# Patient Record
Sex: MALE | Race: BLACK OR AFRICAN AMERICAN
[De-identification: names, ages, dates, MRNs, and addresses within clinical notes are randomized per-mention and may not be internally consistent; named-entity substitution may affect disease eponyms.]

---

## 2021-02-01 ENCOUNTER — HOSPITAL ENCOUNTER (EMERGENCY)
Dept: HOSPITAL 72 - EMR | Age: 31
LOS: 1 days | Discharge: HOME | End: 2021-02-02
Payer: SELF-PAY

## 2021-02-01 VITALS — HEIGHT: 74 IN | BODY MASS INDEX: 20.53 KG/M2 | WEIGHT: 160 LBS

## 2021-02-01 DIAGNOSIS — J45.909: ICD-10-CM

## 2021-02-01 DIAGNOSIS — R07.9: Primary | ICD-10-CM

## 2021-02-01 DIAGNOSIS — Z79.899: ICD-10-CM

## 2021-02-01 PROCEDURE — 74175 CTA ABDOMEN W/CONTRAST: CPT

## 2021-02-01 PROCEDURE — 71045 X-RAY EXAM CHEST 1 VIEW: CPT

## 2021-02-01 PROCEDURE — 99284 EMERGENCY DEPT VISIT MOD MDM: CPT

## 2021-02-01 PROCEDURE — 85379 FIBRIN DEGRADATION QUANT: CPT

## 2021-02-01 PROCEDURE — 93005 ELECTROCARDIOGRAM TRACING: CPT

## 2021-02-01 PROCEDURE — 85025 COMPLETE CBC W/AUTO DIFF WBC: CPT

## 2021-02-01 PROCEDURE — 36415 COLL VENOUS BLD VENIPUNCTURE: CPT

## 2021-02-01 PROCEDURE — 84484 ASSAY OF TROPONIN QUANT: CPT

## 2021-02-01 PROCEDURE — 71275 CT ANGIOGRAPHY CHEST: CPT

## 2021-02-01 PROCEDURE — 80053 COMPREHEN METABOLIC PANEL: CPT

## 2021-02-01 PROCEDURE — 80307 DRUG TEST PRSMV CHEM ANLYZR: CPT

## 2021-02-02 VITALS — DIASTOLIC BLOOD PRESSURE: 75 MMHG | SYSTOLIC BLOOD PRESSURE: 120 MMHG

## 2021-02-02 VITALS — DIASTOLIC BLOOD PRESSURE: 86 MMHG | SYSTOLIC BLOOD PRESSURE: 137 MMHG

## 2021-02-02 VITALS — DIASTOLIC BLOOD PRESSURE: 84 MMHG | SYSTOLIC BLOOD PRESSURE: 112 MMHG

## 2021-02-02 LAB
ADD MANUAL DIFF: NO
ALBUMIN SERPL-MCNC: 4.4 G/DL (ref 3.4–5)
ALBUMIN/GLOB SERPL: 1.1 {RATIO} (ref 1–2.7)
ALP SERPL-CCNC: 80 U/L (ref 46–116)
ALT SERPL-CCNC: 18 U/L (ref 12–78)
ANION GAP SERPL CALC-SCNC: 10 MMOL/L (ref 5–15)
AST SERPL-CCNC: 21 U/L (ref 15–37)
BASOPHILS NFR BLD AUTO: 1.7 % (ref 0–2)
BILIRUB SERPL-MCNC: 0.8 MG/DL (ref 0.2–1)
BUN SERPL-MCNC: 10 MG/DL (ref 7–18)
CALCIUM SERPL-MCNC: 9.5 MG/DL (ref 8.5–10.1)
CHLORIDE SERPL-SCNC: 103 MMOL/L (ref 98–107)
CO2 SERPL-SCNC: 26 MMOL/L (ref 21–32)
CREAT SERPL-MCNC: 0.9 MG/DL (ref 0.55–1.3)
EOSINOPHIL NFR BLD AUTO: 0.6 % (ref 0–3)
ERYTHROCYTE [DISTWIDTH] IN BLOOD BY AUTOMATED COUNT: 11.9 % (ref 11.6–14.8)
GLOBULIN SER-MCNC: 3.9 G/DL
HCT VFR BLD CALC: 45.5 % (ref 42–52)
HGB BLD-MCNC: 14.5 G/DL (ref 14.2–18)
LYMPHOCYTES NFR BLD AUTO: 54.8 % (ref 20–45)
MCV RBC AUTO: 85 FL (ref 80–99)
MONOCYTES NFR BLD AUTO: 7.5 % (ref 1–10)
NEUTROPHILS NFR BLD AUTO: 35.3 % (ref 45–75)
PLATELET # BLD: 201 K/UL (ref 150–450)
POTASSIUM SERPL-SCNC: 3.8 MMOL/L (ref 3.5–5.1)
RBC # BLD AUTO: 5.38 M/UL (ref 4.7–6.1)
SODIUM SERPL-SCNC: 139 MMOL/L (ref 136–145)
WBC # BLD AUTO: 6 K/UL (ref 4.8–10.8)

## 2021-02-02 NOTE — DIAGNOSTIC IMAGING REPORT
EXAM:

  XR Chest, 1 View

 

CLINICAL HISTORY:

  CP

 

TECHNIQUE:

  Frontal view of the chest.

 

COMPARISON:

  No relevant prior studies available.

 

FINDINGS:

  Lungs:  No consolidation.

  Pleural space:  No pleural effusion. No pneumothorax.

  Heart:  Unremarkable.  No cardiomegaly.

 

IMPRESSION:     

  No acute cardiopulmonary abnormality.

## 2021-02-02 NOTE — NUR
ED Nurse Note:



Pt ambulated into ED. AAO x4, c/o chest pressure worse the past couple of days. 
EKG completed, urine and labs collected.

## 2021-02-02 NOTE — EMERGENCY ROOM REPORT
History of Present Illness


General


Chief Complaint:  Chest Pain


Source:  Patient





Present Illness


HPI


30-year-old male with no relevant past medical history here with chest pain for 

2 days.  Patient says that he was walking yesterday and felt sudden onset of 

left-sided chest pain rating down his left arm.  Patient and his wife have a 

home blood pressure monitor and he said that each time he took his blood pressu

re his right arm blood pressure was around 115/80 and his left arm blood 

pressure was around 145/90.  He took an aspirin 2 days ago without much relief. 

Pain is cramping in nature, located on the left side of his chest, radiates down

his left arm and into his left upper back.  No fevers, chills, palpitations, 

back pain, abdominal pain, nausea, vomiting, diaphoresis, diarrhea, dysuria.


Allergies:  


Coded Allergies:  


     Dairy (Verified  Allergy, Unknown, 2/1/21)





COVID-19 Screening


Contact w/high risk pt:  No


Experienced COVID-19 symptoms?:  No


COVID-19 Testing performed PTA:  No


COVID-19 Screening:  Negative COVID-19


COVID-19 Testing Source:  advance prn





Nursing Documentation-PMH


Hx Asthma:  Yes





Review of Systems


All Other Systems:  negative except mentioned in HPI





Physical Exam





Vital Signs








  Date Time  Temp Pulse Resp B/P (MAP) Pulse Ox O2 Delivery O2 Flow Rate FiO2


 


2/1/21 23:51 98.2 78 20 137/86 (103) 98   








Sp02 EP Interpretation:  reviewed, normal


General Appearance:  no apparent distress, alert, non-toxic


Head:  normocephalic, atraumatic


Eyes:  bilateral eye normal inspection, bilateral eye PERRL


ENT:  hearing grossly normal, normal pharynx, no angioedema, normal voice


Neck:  full range of motion, supple/symm/no masses


Respiratory:  chest non-tender, lungs clear, normal breath sounds, speaking full

sentences


Cardiovascular #1:  regular rate, rhythm, no edema


Cardiovascular #2:  2+ carotid (R), 2+ carotid (L), 2+ radial (R), 2+ radial 

(L), 2+ dorsalis pedis (R), 2+ dorsalis pedis (L)


Gastrointestinal:  normal bowel sounds, non tender, soft, non-distended, no 

guarding, no rebound


Rectal:  deferred


Genitourinary:  normal inspection, no CVA tenderness


Musculoskeletal:  back normal, normal range of motion, gait/station normal, non-

tender


Neurologic:  alert, motor strength/tone normal, oriented x3, sensory intact, 

responsive, speech normal


Psychiatric:  judgement/insight normal, memory normal, mood/affect normal, no 

suicidal/homicidal ideation


Lymphatic:  no adenopathy





Medical Decision Making


Diagnostic Impression:  


   Primary Impression:  


   Chest pain


ER Course


EKG: NSR, no ischemia, intervals WNL. No ectopy.  Rate 60 bpm


Rhythm strip: patient monitored for arrhythmias - no malignant dysrhythmias, 

runs of PVCs, nor pauses noted





CXR: No infiltrate/effusion. Mediastinum within normal limits. No 

consolidations. No free air under the diaphragm. No bony abnormalities





CTA chest abd pel:


FINDINGS:


  Pulmonary arteries:  There is no evidence of pulmonary embolism.


  Aorta:  There is no evidence of thoracic aortic dissection.


  Lungs:  Unremarkable.  No mass.  No consolidation.


  Pleural space:  Unremarkable.  No significant effusion.  No 


pneumothorax.


  Heart:  Unremarkable.  No cardiomegaly.  No significant pericardial 


effusion.  No evidence of RV dysfunction.


  Bones/joints:  No acute fracture.  No dislocation.


  Soft tissues:  Unremarkable.


  Lymph nodes:  Unremarkable.  No enlarged lymph nodes.


 


IMPRESSION:     


  No acute findings.  No evidence of pulmonary embolism.


 


_______________________________________________


 


EXAM:


  CT Abdomen and Pelvis With Intravenous Contrast


 


CLINICAL HISTORY:


  CP


 


TECHNIQUE:


  Axial computed tomography images of the abdomen and pelvis with 


intravenous contrast.  CTDI is 59 mGy and DLP is 358 mGy-cm.  One or more 


of the following dose reduction techniques were used: automated exposure 


control, adjustment of the mA and/or kV according to patient size, use of 


iterative reconstruction technique.


 


COMPARISON:


  No relevant prior studies available.


 


FINDINGS:


  Lung bases:  Unremarkable.  No mass.  No consolidation.


 


 ABDOMEN:


  Liver:  Unremarkable.  No mass.


  Gallbladder and bile ducts:  Unremarkable.  No calcified stones.  No 


ductal dilation.


  Pancreas:  Unremarkable.  No mass.  No ductal dilation.


  Spleen:  Unremarkable.  No splenomegaly.


  Adrenals:  Unremarkable.  No mass.


  Kidneys and ureters:  Unremarkable.  No solid mass.  No hydronephrosis.


  Stomach and bowel:  Unremarkable.  No obstruction.  No mucosal 


thickening.


 


 PELVIS:


  Appendix:  No findings to suggest acute appendicitis.


  Bladder:  Unremarkable.  No mass.


  Reproductive:  Unremarkable as visualized.


 


 ABDOMEN and PELVIS:


  Intraperitoneal space:  There is trace free fluid in the pelvis.  No 


free air.


  Bones/joints:  No acute fracture.  No dislocation.


  Soft tissues:  Unremarkable.


  Vasculature:  Unremarkable.  No abdominal aortic aneurysm.


  Lymph nodes:  Unremarkable.  No enlarged lymph nodes.


 


IMPRESSION:     


1.  No acute abdominal or pelvic pathology.


2.  Trace free fluid in the pelvis which is nonspecific.


 








Laboratory Tests








Test


 2/2/21


00:15 2/2/21


00:33


 


Urine Opiates Screen


 Negative


(NEGATIVE) 





 


Urine Barbiturates Screen


 Negative


(NEGATIVE) 





 


Phencyclidine (PCP) Screen


 Negative


(NEGATIVE) 





 


Urine Amphetamines Screen


 Negative


(NEGATIVE) 





 


Urine Benzodiazepines Screen


 Negative


(NEGATIVE) 





 


Urine Cocaine Screen


 Negative


(NEGATIVE) 





 


Urine Marijuana (THC) Screen


 Negative


(NEGATIVE) 





 


White Blood Count


 


 6.0 K/UL


(4.8-10.8)


 


Red Blood Count


 


 5.38 M/UL


(4.70-6.10)


 


Hemoglobin


 


 14.5 G/DL


(14.2-18.0)


 


Hematocrit


 


 45.5 %


(42.0-52.0)


 


Mean Corpuscular Volume  85 FL (80-99)  


 


Mean Corpuscular Hemoglobin


 


 26.9 PG


(27.0-31.0)  L


 


Mean Corpuscular Hemoglobin


Concent 


 31.8 G/DL


(32.0-36.0)  L


 


Red Cell Distribution Width


 


 11.9 %


(11.6-14.8)


 


Platelet Count


 


 201 K/UL


(150-450)


 


Mean Platelet Volume


 


 8.2 FL


(6.5-10.1)


 


Neutrophils (%) (Auto)


 


 35.3 %


(45.0-75.0)  L


 


Lymphocytes (%) (Auto)


 


 54.8 %


(20.0-45.0)  H


 


Monocytes (%) (Auto)


 


 7.5 %


(1.0-10.0)


 


Eosinophils (%) (Auto)


 


 0.6 %


(0.0-3.0)


 


Basophils (%) (Auto)


 


 1.7 %


(0.0-2.0)


 


D-Dimer


 


 < 0.19 mg/L


FEU


 


Sodium Level


 


 139 MMOL/L


(136-145)


 


Potassium Level


 


 3.8 MMOL/L


(3.5-5.1)


 


Chloride Level


 


 103 MMOL/L


()


 


Carbon Dioxide Level


 


 26 MMOL/L


(21-32)


 


Anion Gap


 


 10 mmol/L


(5-15)


 


Blood Urea Nitrogen


 


 10 mg/dL


(7-18)


 


Creatinine


 


 0.9 MG/DL


(0.55-1.30)


 


Estimated Glomerular


Filtration Rate 


 > 60 mL/min


(>60)


 


Glucose Level


 


 81 MG/DL


()


 


Calcium Level


 


 9.5 MG/DL


(8.5-10.1)


 


Total Bilirubin


 


 0.8 MG/DL


(0.2-1.0)


 


Aspartate Amino Transferase


(AST) 


 21 U/L (15-37)





 


Alanine Aminotransferase (ALT)


 


 18 U/L (12-78)





 


Alkaline Phosphatase


 


 80 U/L


()


 


Troponin I


 


 0.000 ng/mL


(0.000-0.056)


 


Total Protein


 


 8.3 G/DL


(6.4-8.2)  H


 


Albumin


 


 4.4 G/DL


(3.4-5.0)


 


Globulin  3.9 g/dL  


 


Albumin/Globulin Ratio  1.1 (1.0-2.7)  











ddx: ACS, dissection, PE, PTX, pericarditis, myocarditis, musculoskeletal, 

GERD/GI conditions, anxiety





30-year-old male with history of asthma here with chest pain radiating to his 

back.  Patient on clear breath sounds in the emergency department.  Claims that 

he had a blood pressure difference between the extremities.  There was a very 

minor difference of 10 mmHg between the left and right upper extremities blood 

pressure in the emergency department.  CBC, CMP, troponin, D-dimer all 

unremarkable.  CTA chest abdomen pelvis was negative for dissection, or 

pulmonary embolism.  Patient was asymptomatic in the emergency department and 

had normal vital signs throughout his entire stay.  He was told that he should 

return to the emergency department if he has any worsening symptoms.  Patient 

then told me that "I have been under a lot of anxiety lately and that seems to 

bring on the symptoms."  Was given a prescription for a short course of low-dose

Ativan.  Given a refill for his albuterol inhaler and told to return to the 

emergency department worsening symptoms.  He expressed understanding was discha

rged.





Last Vital Signs








  Date Time  Temp Pulse Resp B/P (MAP) Pulse Ox O2 Delivery O2 Flow Rate FiO2


 


2/1/21 23:51 98.2 78 20 137/86 (103) 98   








Scripts


Albuterol Sulfate* (Albuterol Sulfate Hfa*) 8.5 Gm Hfa.aer.ad


2 PUFF INH Q3H, #1 INH


   Prov: Ariel Carpio M.D.         2/2/21 


Lorazepam* (ATIVAN*) 0.5 Mg Tablet


0.5 MG ORAL THREE TIMES A DAY, #10 TAB


   Prov: Ariel Carpio M.D.         2/2/21 


Acetaminophen* (ACETAMINOPHEN EXTRA STRENGTH*) 500 Mg Tablet


500 MG ORAL Q6H, #20 TAB


   Prov: Ariel Carpio M.D.         2/2/21











Ariel Carpio M.D.                 Feb 2, 2021 00:16

## 2021-02-02 NOTE — NUR
ER DISCHARGE NOTE:

Patient is cleared to be discharged per ER MD, pt is aao x4, on room air, with 
stable vital signs. pt was given d/c and prescription instructions, pt was able 
to verbalize understanding, pt id band and iv site removed without 
complications. pt is able to ambulate with steady gait. pt took all belongings.

## 2021-02-02 NOTE — DIAGNOSTIC IMAGING REPORT
EXAM:

  CT Angiography Chest With Intravenous Contrast

 

CLINICAL HISTORY:

  CP

 

TECHNIQUE:

  Axial computed tomographic angiography images of the chest with 

intravenous contrast.  CTDI is 59 mGy and DLP is 358 mGy-cm.  One or more 

of the following dose reduction techniques were used: automated exposure 

control, adjustment of the mA and/or kV according to patient size, use of 

iterative reconstruction technique.

  MIP reconstructed images were created and reviewed.

  Coronal and sagittal reformatted images were created and reviewed.

 

COMPARISON:

  No relevant prior studies available.

 

FINDINGS:

  Pulmonary arteries:  There is no evidence of pulmonary embolism.

  Aorta:  There is no evidence of thoracic aortic dissection.

  Lungs:  Unremarkable.  No mass.  No consolidation.

  Pleural space:  Unremarkable.  No significant effusion.  No 

pneumothorax.

  Heart:  Unremarkable.  No cardiomegaly.  No significant pericardial 

effusion.  No evidence of RV dysfunction.

  Bones/joints:  No acute fracture.  No dislocation.

  Soft tissues:  Unremarkable.

  Lymph nodes:  Unremarkable.  No enlarged lymph nodes.

 

IMPRESSION:     

  No acute findings.  No evidence of pulmonary embolism.

 

_______________________________________________

 

EXAM:

  CT Abdomen and Pelvis With Intravenous Contrast

 

CLINICAL HISTORY:

  CP

 

TECHNIQUE:

  Axial computed tomography images of the abdomen and pelvis with 

intravenous contrast.  CTDI is 59 mGy and DLP is 358 mGy-cm.  One or more 

of the following dose reduction techniques were used: automated exposure 

control, adjustment of the mA and/or kV according to patient size, use of 

iterative reconstruction technique.

 

COMPARISON:

  No relevant prior studies available.

 

FINDINGS:

  Lung bases:  Unremarkable.  No mass.  No consolidation.

 

 ABDOMEN:

  Liver:  Unremarkable.  No mass.

  Gallbladder and bile ducts:  Unremarkable.  No calcified stones.  No 

ductal dilation.

  Pancreas:  Unremarkable.  No mass.  No ductal dilation.

  Spleen:  Unremarkable.  No splenomegaly.

  Adrenals:  Unremarkable.  No mass.

  Kidneys and ureters:  Unremarkable.  No solid mass.  No hydronephrosis.

  Stomach and bowel:  Unremarkable.  No obstruction.  No mucosal 

thickening.

 

 PELVIS:

  Appendix:  No findings to suggest acute appendicitis.

  Bladder:  Unremarkable.  No mass.

  Reproductive:  Unremarkable as visualized.

 

 ABDOMEN and PELVIS:

  Intraperitoneal space:  There is trace free fluid in the pelvis.  No 

free air.

  Bones/joints:  No acute fracture.  No dislocation.

  Soft tissues:  Unremarkable.

  Vasculature:  Unremarkable.  No abdominal aortic aneurysm.

  Lymph nodes:  Unremarkable.  No enlarged lymph nodes.

 

IMPRESSION:     

1.  No acute abdominal or pelvic pathology.

2.  Trace free fluid in the pelvis which is nonspecific.

## 2021-02-03 NOTE — CARDIOLOGY REPORT
--------------- APPROVED REPORT --------------





EKG Measurement

Heart [id number]

SD [id number]

[id number]

[id number]

[id number]



<Conclusion>

Normal sinus rhythm

Normal ECG